# Patient Record
Sex: FEMALE | Race: WHITE | NOT HISPANIC OR LATINO | Employment: FULL TIME | URBAN - METROPOLITAN AREA
[De-identification: names, ages, dates, MRNs, and addresses within clinical notes are randomized per-mention and may not be internally consistent; named-entity substitution may affect disease eponyms.]

---

## 2021-07-30 ENCOUNTER — LAB REQUISITION (OUTPATIENT)
Dept: LAB | Facility: CLINIC | Age: 26
End: 2021-07-30

## 2021-07-30 DIAGNOSIS — Z32.01 ENCOUNTER FOR PREGNANCY TEST, RESULT POSITIVE: ICD-10-CM

## 2021-07-30 LAB
BASOPHILS # BLD AUTO: 0.1 10E3/UL (ref 0–0.2)
BASOPHILS NFR BLD AUTO: 1 %
EOSINOPHIL # BLD AUTO: 0.3 10E3/UL (ref 0–0.7)
EOSINOPHIL NFR BLD AUTO: 3 %
ERYTHROCYTE [DISTWIDTH] IN BLOOD BY AUTOMATED COUNT: 11.8 % (ref 10–15)
HCT VFR BLD AUTO: 35.5 % (ref 35–47)
HGB BLD-MCNC: 12.3 G/DL (ref 11.7–15.7)
IMM GRANULOCYTES # BLD: 0 10E3/UL
IMM GRANULOCYTES NFR BLD: 0 %
LYMPHOCYTES # BLD AUTO: 2 10E3/UL (ref 0.8–5.3)
LYMPHOCYTES NFR BLD AUTO: 21 %
MCH RBC QN AUTO: 28.9 PG (ref 26.5–33)
MCHC RBC AUTO-ENTMCNC: 34.6 G/DL (ref 31.5–36.5)
MCV RBC AUTO: 83 FL (ref 78–100)
MONOCYTES # BLD AUTO: 0.5 10E3/UL (ref 0–1.3)
MONOCYTES NFR BLD AUTO: 5 %
NEUTROPHILS # BLD AUTO: 6.7 10E3/UL (ref 1.6–8.3)
NEUTROPHILS NFR BLD AUTO: 70 %
NRBC # BLD AUTO: 0 10E3/UL
NRBC BLD AUTO-RTO: 0 /100
PLATELET # BLD AUTO: 189 10E3/UL (ref 150–450)
RBC # BLD AUTO: 4.26 10E6/UL (ref 3.8–5.2)
WBC # BLD AUTO: 9.5 10E3/UL (ref 4–11)

## 2021-07-30 PROCEDURE — 86850 RBC ANTIBODY SCREEN: CPT | Performed by: FAMILY MEDICINE

## 2021-07-30 PROCEDURE — 86780 TREPONEMA PALLIDUM: CPT | Performed by: FAMILY MEDICINE

## 2021-07-30 PROCEDURE — 85025 COMPLETE CBC W/AUTO DIFF WBC: CPT | Performed by: FAMILY MEDICINE

## 2021-07-30 PROCEDURE — 86762 RUBELLA ANTIBODY: CPT | Performed by: FAMILY MEDICINE

## 2021-07-30 PROCEDURE — 87340 HEPATITIS B SURFACE AG IA: CPT | Performed by: FAMILY MEDICINE

## 2021-07-30 PROCEDURE — 87491 CHLMYD TRACH DNA AMP PROBE: CPT | Performed by: FAMILY MEDICINE

## 2021-07-30 PROCEDURE — 86900 BLOOD TYPING SEROLOGIC ABO: CPT | Performed by: FAMILY MEDICINE

## 2021-07-31 LAB
ABO/RH(D): NORMAL
ANTIBODY SCREEN: NEGATIVE
HBV SURFACE AG SERPL QL IA: NONREACTIVE
SPECIMEN EXPIRATION DATE: NORMAL
SPECIMEN EXPIRATION DATE: NORMAL
T PALLIDUM AB SER QL: NEGATIVE

## 2021-08-01 LAB — C TRACH DNA SPEC QL NAA+PROBE: NEGATIVE

## 2021-08-02 LAB — RUBV IGG SERPL QL IA: POSITIVE

## 2021-08-31 ENCOUNTER — LAB REQUISITION (OUTPATIENT)
Dept: LAB | Facility: CLINIC | Age: 26
End: 2021-08-31

## 2021-08-31 DIAGNOSIS — Z01.419 ENCOUNTER FOR GYNECOLOGICAL EXAMINATION (GENERAL) (ROUTINE) WITHOUT ABNORMAL FINDINGS: ICD-10-CM

## 2021-08-31 PROCEDURE — 87624 HPV HI-RISK TYP POOLED RSLT: CPT | Performed by: FAMILY MEDICINE

## 2021-08-31 PROCEDURE — G0123 SCREEN CERV/VAG THIN LAYER: HCPCS | Performed by: FAMILY MEDICINE

## 2021-09-03 LAB
HUMAN PAPILLOMA VIRUS 16 DNA: NEGATIVE
HUMAN PAPILLOMA VIRUS 18 DNA: NEGATIVE
HUMAN PAPILLOMA VIRUS FINAL DIAGNOSIS: NORMAL
HUMAN PAPILLOMA VIRUS OTHER HR: NEGATIVE

## 2021-09-10 LAB
BKR LAB AP GYN ADEQUACY: NORMAL
BKR LAB AP GYN INTERPRETATION: NORMAL
BKR LAB AP HPV REFLEX: NORMAL
BKR LAB AP LMP: NORMAL
BKR LAB AP PREVIOUS ABNORMAL: NORMAL
PATH REPORT.COMMENTS IMP SPEC: NORMAL
PATH REPORT.RELEVANT HX SPEC: NORMAL

## 2024-01-06 LAB
ABO (EXTERNAL): NORMAL
HEPATITIS B SURFACE ANTIGEN (EXTERNAL): NONREACTIVE
HIV1+2 AB SERPL QL IA: NONREACTIVE
PLATELET COUNT (EXTERNAL): 186 10E3/UL (ref 140–400)
RH (EXTERNAL): POSITIVE
RUBELLA ANTIBODY IGG (EXTERNAL): NONREACTIVE
TREPONEMA PALLIDUM ANTIBODY (EXTERNAL): NONREACTIVE

## 2024-01-17 ENCOUNTER — TRANSCRIBE ORDERS (OUTPATIENT)
Dept: OTHER | Age: 29
End: 2024-01-17

## 2024-01-17 DIAGNOSIS — R51.9 HEADACHE: Primary | ICD-10-CM

## 2024-02-12 ENCOUNTER — HOSPITAL ENCOUNTER (EMERGENCY)
Facility: HOSPITAL | Age: 29
Discharge: HOME | End: 2024-02-12
Attending: EMERGENCY MEDICINE | Admitting: EMERGENCY MEDICINE

## 2024-02-12 VITALS
RESPIRATION RATE: 16 BRPM | HEART RATE: 92 BPM | OXYGEN SATURATION: 99 % | DIASTOLIC BLOOD PRESSURE: 56 MMHG | BODY MASS INDEX: 21.26 KG/M2 | TEMPERATURE: 98.6 F | SYSTOLIC BLOOD PRESSURE: 103 MMHG | WEIGHT: 120 LBS | HEIGHT: 63 IN

## 2024-02-12 DIAGNOSIS — R11.2 NAUSEA AND VOMITING, UNSPECIFIED VOMITING TYPE: ICD-10-CM

## 2024-02-12 LAB
ALBUMIN SERPL BCG-MCNC: 4.1 G/DL (ref 3.5–5.2)
ALP SERPL-CCNC: 40 U/L (ref 40–150)
ALT SERPL W P-5'-P-CCNC: 17 U/L (ref 0–50)
ANION GAP SERPL CALCULATED.3IONS-SCNC: 12 MMOL/L (ref 7–15)
AST SERPL W P-5'-P-CCNC: 20 U/L (ref 0–45)
BILIRUB DIRECT SERPL-MCNC: <0.2 MG/DL (ref 0–0.3)
BILIRUB SERPL-MCNC: 0.7 MG/DL
BUN SERPL-MCNC: 8.4 MG/DL (ref 6–20)
CALCIUM SERPL-MCNC: 8.1 MG/DL (ref 8.6–10)
CHLORIDE SERPL-SCNC: 104 MMOL/L (ref 98–107)
CREAT SERPL-MCNC: 0.49 MG/DL (ref 0.51–0.95)
DEPRECATED HCO3 PLAS-SCNC: 21 MMOL/L (ref 22–29)
EGFRCR SERPLBLD CKD-EPI 2021: >90 ML/MIN/1.73M2
ERYTHROCYTE [DISTWIDTH] IN BLOOD BY AUTOMATED COUNT: 12.9 % (ref 10–15)
GLUCOSE SERPL-MCNC: 96 MG/DL (ref 70–99)
HCT VFR BLD AUTO: 39.1 % (ref 35–47)
HGB BLD-MCNC: 13.7 G/DL (ref 11.7–15.7)
LIPASE SERPL-CCNC: 69 U/L (ref 13–60)
MCH RBC QN AUTO: 29.3 PG (ref 26.5–33)
MCHC RBC AUTO-ENTMCNC: 35 G/DL (ref 31.5–36.5)
MCV RBC AUTO: 84 FL (ref 78–100)
PLATELET # BLD AUTO: 250 10E3/UL (ref 150–450)
POTASSIUM SERPL-SCNC: 3.8 MMOL/L (ref 3.4–5.3)
PROT SERPL-MCNC: 7 G/DL (ref 6.4–8.3)
RBC # BLD AUTO: 4.67 10E6/UL (ref 3.8–5.2)
SODIUM SERPL-SCNC: 137 MMOL/L (ref 135–145)
WBC # BLD AUTO: 11.9 10E3/UL (ref 4–11)

## 2024-02-12 PROCEDURE — 85027 COMPLETE CBC AUTOMATED: CPT | Performed by: EMERGENCY MEDICINE

## 2024-02-12 PROCEDURE — 96375 TX/PRO/DX INJ NEW DRUG ADDON: CPT

## 2024-02-12 PROCEDURE — 83690 ASSAY OF LIPASE: CPT | Performed by: EMERGENCY MEDICINE

## 2024-02-12 PROCEDURE — 99284 EMERGENCY DEPT VISIT MOD MDM: CPT | Mod: 25

## 2024-02-12 PROCEDURE — 258N000003 HC RX IP 258 OP 636: Performed by: EMERGENCY MEDICINE

## 2024-02-12 PROCEDURE — 36415 COLL VENOUS BLD VENIPUNCTURE: CPT | Performed by: EMERGENCY MEDICINE

## 2024-02-12 PROCEDURE — 96361 HYDRATE IV INFUSION ADD-ON: CPT

## 2024-02-12 PROCEDURE — 96374 THER/PROPH/DIAG INJ IV PUSH: CPT

## 2024-02-12 PROCEDURE — 80053 COMPREHEN METABOLIC PANEL: CPT | Performed by: EMERGENCY MEDICINE

## 2024-02-12 PROCEDURE — 250N000011 HC RX IP 250 OP 636: Mod: JZ | Performed by: EMERGENCY MEDICINE

## 2024-02-12 RX ORDER — DIPHENHYDRAMINE HYDROCHLORIDE 50 MG/ML
25 INJECTION INTRAMUSCULAR; INTRAVENOUS ONCE
Status: COMPLETED | OUTPATIENT
Start: 2024-02-12 | End: 2024-02-12

## 2024-02-12 RX ORDER — METOCLOPRAMIDE HYDROCHLORIDE 5 MG/ML
10 INJECTION INTRAMUSCULAR; INTRAVENOUS ONCE
Status: COMPLETED | OUTPATIENT
Start: 2024-02-12 | End: 2024-02-12

## 2024-02-12 RX ORDER — PROMETHAZINE HYDROCHLORIDE 25 MG/1
25 SUPPOSITORY RECTAL EVERY 6 HOURS PRN
Qty: 12 SUPPOSITORY | Refills: 0 | Status: ON HOLD | OUTPATIENT
Start: 2024-02-12 | End: 2024-07-30

## 2024-02-12 RX ORDER — METOCLOPRAMIDE 10 MG/1
10 TABLET ORAL
Qty: 12 TABLET | Refills: 0 | Status: ON HOLD | OUTPATIENT
Start: 2024-02-12 | End: 2024-07-30

## 2024-02-12 RX ADMIN — SODIUM CHLORIDE, POTASSIUM CHLORIDE, SODIUM LACTATE AND CALCIUM CHLORIDE 1000 ML: 600; 310; 30; 20 INJECTION, SOLUTION INTRAVENOUS at 17:00

## 2024-02-12 RX ADMIN — DIPHENHYDRAMINE HYDROCHLORIDE 25 MG: 50 INJECTION, SOLUTION INTRAMUSCULAR; INTRAVENOUS at 14:14

## 2024-02-12 RX ADMIN — SODIUM CHLORIDE, POTASSIUM CHLORIDE, SODIUM LACTATE AND CALCIUM CHLORIDE 1000 ML: 600; 310; 30; 20 INJECTION, SOLUTION INTRAVENOUS at 14:12

## 2024-02-12 RX ADMIN — METOCLOPRAMIDE 10 MG: 5 INJECTION, SOLUTION INTRAMUSCULAR; INTRAVENOUS at 16:59

## 2024-02-12 RX ADMIN — PROMETHAZINE HYDROCHLORIDE 25 MG: 25 INJECTION INTRAMUSCULAR; INTRAVENOUS at 14:22

## 2024-02-12 ASSESSMENT — ACTIVITIES OF DAILY LIVING (ADL)
ADLS_ACUITY_SCORE: 35
ADLS_ACUITY_SCORE: 35

## 2024-02-12 NOTE — ED TRIAGE NOTES
Pt referred to ER for continuous nausea and vomiting. Light sensitivity, chills, body aches. Pt was tested at clinic for influenza and Covid, both were negative. Abdominal pain.     Pt is 17 weeks pregnant.

## 2024-02-12 NOTE — ED NOTES
Expected Patient Referral to ED  1:24 PM    Referring Clinic/Provider:    [facility]      Reason for referral/Clinical facts:  Intractable nausea and vomiting.  Baby ultrasound okay, needs IV fluids, could not keep down Zofran.  Coming by private car,  driving.        Recommendations provided:  Send to ED for further evaluation    Caller was informed that this institution does possess the capabilities and/or resources to provide for patient and should be transferred to our facility.    Discussed that if direct admit is sought and any hurdles are encountered, this ED would be happy to see the patient and evaluate.    Informed caller that recommendations provided are recommendations based only on the facts provided and that they responsible to accept or reject the advice, or to seek a formal in person consultation as needed and that this ED will see/treat patient should they arrive.      Nura Keating MD  [facility] EMERGENCY DEPARTMENT  [address] [place] [id number]  [phone]       Nura Keating MD  02/12/24 5618

## 2024-02-12 NOTE — ED PROVIDER NOTES
EMERGENCY DEPARTMENT ENCOUNTER      NAME: Jessica Jose  AGE: 28 year old female  YOB: 1995  MRN: [id number]  EVALUATION DATE & TIME: 2/12/2024  1:54 PM    PCP: No primary care provider on file.    ED PROVIDER: Susan Brand MD    Chief Complaint   Patient presents with    Nausea & Vomiting    Abdominal Pain         FINAL IMPRESSION:  1. Nausea and vomiting, unspecified vomiting type          ED COURSE & MEDICAL DECISION MAKING:    Pertinent Labs & Imaging studies reviewed. (See chart for details)  28 year old female with history of G2, P1 at approximately 17 weeks gestation who presents to the Emergency Department for evaluation of nausea and vomiting in association with some generalized weakness, subjective chills, photosensitivity and intermittent episodes of left-sided crampy abdominal pain since last night.  COVID and influenza negative at clinic prior to arrival.  Concern for hyperemesis, dehydration, electrolyte abnormality, GERD, pancreatitis.  Hepatobiliary pathology, appendicitis, bowel obstruction less likely given left-sided abdominal pain and normal bowel movements.  With her subjective body symptoms concern for potential food poisoning.  Her and  did eat the same meal last night however and he is not having any symptomatology.  Overall her abdomen is benign and my concern for peritoneal etiology is low.    Patient placed on monitor, IV established and blood obtained.  Initially tachycardic, normalized on recheck.  Given 1 L LR bolus, Phenergan, Benadryl.  CBC, BMP, LFTs, lipase really unremarkable.  Patient initially felt improved after the above, was able to tolerate liquids, but when she tried crackers vomited again.  Given second liter LR bolus along with Reglan and was able to tolerate food and fluids.  Pain has resolved, and she is not having any abdominal pain.  Abdomen remains benign.  Safe for discharge home with reassuring workup above, encouraged close follow-up  with her OB/GYN.  Warning signs return to ED discussed.          ED Course as of 02/12/24 1958 Mon Feb 12, 2024 1654 Patient states pain is resolved.  Felt improved after the Phenergan but tried oral challenge.  Able to keep down liquids, but vomited cracker.   1801 Pt now tolerating food/fluids       Medical Decision Making    History:  Supplemental history from: Family Member/Significant Other  External Record(s) reviewed: Documented in chart    Work Up:  Chart documentation includes differential considered and any EKGs or imaging independently interpreted by provider, see MDM  In additional to work up documented, I considered the following work up: see MDM    External consultation:  Discussion of management with another provider: N/A    Complicating factors:  Care impacted by chronic illness: N/A  Care affected by social determinants of health: Access to Medical Care referred to ED    Disposition considerations: Discharge. I prescribed additional prescription strength medication(s) as charted. See documentation for any additional details.        At the conclusion of the encounter I discussed the results of all of the tests and the disposition. The questions were answered. The patient or family acknowledged understanding and was agreeable with the care plan.    MEDICATIONS GIVEN IN THE EMERGENCY:  Medications   lactated ringers BOLUS 1,000 mL (0 mLs Intravenous Stopped 2/12/24 1655)   promethazine (PHENERGAN) 25 mg in sodium chloride 0.9 % 55 mL intermittent infusion (25 mg Intravenous $Given 2/12/24 1422)   diphenhydrAMINE (BENADRYL) injection 25 mg (25 mg Intravenous $Given 2/12/24 1414)   lactated ringers BOLUS 1,000 mL (0 mLs Intravenous Stopped 2/12/24 1833)   metoclopramide (REGLAN) injection 10 mg (10 mg Intravenous $Given 2/12/24 1659)       NEW PRESCRIPTIONS STARTED AT TODAY'S ER VISIT  Discharge Medication List as of 2/12/2024  6:34 PM        START taking these medications    Details   metoclopramide  (REGLAN) 10 MG tablet Take 1 tablet (10 mg) by mouth 4 times daily (before meals and nightly), Disp-12 tablet, R-0, Local Print      promethazine (PHENERGAN) 25 MG suppository Place 1 suppository (25 mg) rectally every 6 hours as needed for nausea, Disp-12 suppository, R-0, Local Print                =================================================================    HPI    Patient information was obtained from: Patient,     Use of Intrepreter: N/A      Jessica Jose is a 28 year old female with pertinent medical history of G2, P1 at approximately 17 weeks gestation who presents nausea and vomiting.  Patient had a lot of nausea and vomiting associated with her first trimester but this trended better.  Last week she had several days of nausea and vomiting but the seem to be associated with constipation and resolved with MiraLAX and prune juice and she has now been back to having normal bowel movements.  Again starting last night, developed nausea but this worsened today with generalized body aches, chills, photophobia, intractable nausea and vomiting.  Patient was seen at her [facility] where she follows with for OB/GYN.  They did a COVID and flu test and both of these were negative.  Nausea did not resolve after Zofran.  Patient also notes some intermittent sharp stabbing episodes of left sided abdominal pain that comes and goes and then will linger.  This seems to be in association with the nausea.  They did obtain fetal heart rates in clinic that were unremarkable reportedly with normal ultrasound.  Sent here for further evaluation.  No recent travel, sick contacts, raw undercooked meat, seafood, sushi.      PAST MEDICAL HISTORY:  No past medical history on file.    PAST SURGICAL HISTORY:  No past surgical history on file.    CURRENT MEDICATIONS:    None       ALLERGIES:  No Known Allergies    FAMILY HISTORY:  No family history on file.    SOCIAL HISTORY:        VITALS:  Patient Vitals for  "the past 24 hrs:   BP Temp Pulse Resp SpO2 Height Weight   02/12/24 1830 103/56 -- 92 16 99 % -- --   02/12/24 1800 109/65 -- 96 -- 100 % -- --   02/12/24 1730 108/63 -- 105 -- 98 % -- --   02/12/24 1715 -- -- 113 -- 98 % -- --   02/12/24 1700 103/58 -- -- -- -- -- --   02/12/24 1500 116/73 -- 99 -- 100 % -- --   02/12/24 1445 108/67 -- 88 -- 100 % -- --   02/12/24 1430 108/69 -- 86 -- 100 % -- --   02/12/24 1415 110/74 -- 100 16 100 % -- --   02/12/24 1400 107/71 -- -- -- -- -- --   02/12/24 1352 109/66 98.6  F (37  C) 110 16 100 % 1.6 m (5' 3\") 54.4 kg (120 lb)       PHYSICAL EXAM    General Appearance: Well-appearing, well-nourished, no acute distress   Head:  Normocephalic  Chest:  No tenderness or deformity, no crepitus  Cardio: Initially tachycardic normalized on recheck without intervention.  Pulm:  No respiratory distress, clear to auscultation bilaterally  Back:  No CVA tenderness, normal ROM  Abdomen:  Soft, non-tender, no rebound or guarding.  Uterus is gravid below the umbilicus  Extremities: Normal gait  Neuro:  Alert and oriented ×3     RADIOLOGY/LABS:  Reviewed all pertinent imaging. Please see official radiology report. All pertinent labs reviewed and interpreted.    Results for orders placed or performed during the hospital encounter of 02/12/24   CBC (+ platelets, no diff)   Result Value Ref Range    WBC Count 11.9 (H) 4.0 - 11.0 10e3/uL    RBC Count 4.67 3.80 - 5.20 10e6/uL    Hemoglobin 13.7 11.7 - 15.7 g/dL    Hematocrit 39.1 35.0 - 47.0 %    MCV 84 78 - 100 fL    MCH 29.3 26.5 - 33.0 pg    MCHC 35.0 31.5 - 36.5 g/dL    RDW 12.9 10.0 - 15.0 %    Platelet Count 250 150 - 450 10e3/uL   Basic metabolic panel   Result Value Ref Range    Sodium 137 135 - 145 mmol/L    Potassium 3.8 3.4 - 5.3 mmol/L    Chloride 104 98 - 107 mmol/L    Carbon Dioxide (CO2) 21 (L) 22 - 29 mmol/L    Anion Gap 12 7 - 15 mmol/L    Urea Nitrogen 8.4 6.0 - 20.0 mg/dL    Creatinine 0.49 (L) 0.51 - 0.95 mg/dL    GFR Estimate " >90 >60 mL/min/1.73m2    Calcium 8.1 (L) 8.6 - 10.0 mg/dL    Glucose 96 70 - 99 mg/dL   Hepatic function panel   Result Value Ref Range    Protein Total 7.0 6.4 - 8.3 g/dL    Albumin 4.1 3.5 - 5.2 g/dL    Bilirubin Total 0.7 <=1.2 mg/dL    Alkaline Phosphatase 40 40 - 150 U/L    AST 20 0 - 45 U/L    ALT 17 0 - 50 U/L    Bilirubin Direct <0.20 0.00 - 0.30 mg/dL   Result Value Ref Range    Lipase 69 (H) 13 - 60 U/L         Susan Brand MD  Emergency Medicine  [facility] EMERGENCY DEPARTMENT  [address] [facility] [id number] [phone]  Dept: [phone]     Susan Brand MD  02/12/24 1959

## 2024-02-13 NOTE — DISCHARGE INSTRUCTIONS
You may use your Zofran, Reglan and and/or Phenergan suppository as needed.  Small, frequent sips of electrolyte containing food/fluids.

## 2024-06-22 LAB — GROUP B STREPTOCOCCUS (EXTERNAL): NEGATIVE

## 2024-07-23 ENCOUNTER — TRANSFERRED RECORDS (OUTPATIENT)
Dept: HEALTH INFORMATION MANAGEMENT | Facility: CLINIC | Age: 29
End: 2024-07-23

## 2024-07-28 ENCOUNTER — HOSPITAL ENCOUNTER (INPATIENT)
Facility: HOSPITAL | Age: 29
LOS: 2 days | Discharge: HOME | End: 2024-07-30
Attending: OBSTETRICS & GYNECOLOGY | Admitting: OBSTETRICS & GYNECOLOGY

## 2024-07-28 ENCOUNTER — ANESTHESIA (OUTPATIENT)
Dept: OBGYN | Facility: HOSPITAL | Age: 29
End: 2024-07-28

## 2024-07-28 ENCOUNTER — ANESTHESIA EVENT (OUTPATIENT)
Dept: OBGYN | Facility: HOSPITAL | Age: 29
End: 2024-07-28

## 2024-07-28 DIAGNOSIS — O48.0 41 WEEKS GESTATION OF PREGNANCY: Primary | ICD-10-CM

## 2024-07-28 DIAGNOSIS — Z3A.41 41 WEEKS GESTATION OF PREGNANCY: Primary | ICD-10-CM

## 2024-07-28 PROBLEM — Z34.90 PREGNANCY: Status: ACTIVE | Noted: 2024-07-28

## 2024-07-28 LAB
ABO/RH(D): NORMAL
ANTIBODY SCREEN: NEGATIVE
BASOPHILS # BLD AUTO: 0 10E3/UL (ref 0–0.2)
BASOPHILS NFR BLD AUTO: 0 %
EOSINOPHIL # BLD AUTO: 0 10E3/UL (ref 0–0.7)
EOSINOPHIL NFR BLD AUTO: 0 %
ERYTHROCYTE [DISTWIDTH] IN BLOOD BY AUTOMATED COUNT: 12.8 % (ref 10–15)
HCT VFR BLD AUTO: 33.7 % (ref 35–47)
HGB BLD-MCNC: 11.4 G/DL (ref 11.7–15.7)
IMM GRANULOCYTES # BLD: 0.1 10E3/UL
IMM GRANULOCYTES NFR BLD: 1 %
LYMPHOCYTES # BLD AUTO: 1.4 10E3/UL (ref 0.8–5.3)
LYMPHOCYTES NFR BLD AUTO: 15 %
MCH RBC QN AUTO: 27.4 PG (ref 26.5–33)
MCHC RBC AUTO-ENTMCNC: 33.8 G/DL (ref 31.5–36.5)
MCV RBC AUTO: 81 FL (ref 78–100)
MONOCYTES # BLD AUTO: 0.5 10E3/UL (ref 0–1.3)
MONOCYTES NFR BLD AUTO: 5 %
NEUTROPHILS # BLD AUTO: 7.6 10E3/UL (ref 1.6–8.3)
NEUTROPHILS NFR BLD AUTO: 79 %
NRBC # BLD AUTO: 0 10E3/UL
NRBC BLD AUTO-RTO: 0 /100
PLATELET # BLD AUTO: 141 10E3/UL (ref 150–450)
RBC # BLD AUTO: 4.16 10E6/UL (ref 3.8–5.2)
SPECIMEN EXPIRATION DATE: NORMAL
T PALLIDUM AB SER QL: NONREACTIVE
WBC # BLD AUTO: 9.6 10E3/UL (ref 4–11)

## 2024-07-28 PROCEDURE — 36415 COLL VENOUS BLD VENIPUNCTURE: CPT | Performed by: OBSTETRICS & GYNECOLOGY

## 2024-07-28 PROCEDURE — 370N000003 HC ANESTHESIA WARD SERVICE: Performed by: ANESTHESIOLOGY

## 2024-07-28 PROCEDURE — 59400 OBSTETRICAL CARE: CPT | Performed by: ANESTHESIOLOGY

## 2024-07-28 PROCEDURE — 86780 TREPONEMA PALLIDUM: CPT | Performed by: OBSTETRICS & GYNECOLOGY

## 2024-07-28 PROCEDURE — 10907ZC DRAINAGE OF AMNIOTIC FLUID, THERAPEUTIC FROM PRODUCTS OF CONCEPTION, VIA NATURAL OR ARTIFICIAL OPENING: ICD-10-PCS | Performed by: OBSTETRICS & GYNECOLOGY

## 2024-07-28 PROCEDURE — 258N000003 HC RX IP 258 OP 636: Performed by: OBSTETRICS & GYNECOLOGY

## 2024-07-28 PROCEDURE — 3E0R3BZ INTRODUCTION OF ANESTHETIC AGENT INTO SPINAL CANAL, PERCUTANEOUS APPROACH: ICD-10-PCS | Performed by: ANESTHESIOLOGY

## 2024-07-28 PROCEDURE — 00HU33Z INSERTION OF INFUSION DEVICE INTO SPINAL CANAL, PERCUTANEOUS APPROACH: ICD-10-PCS | Performed by: ANESTHESIOLOGY

## 2024-07-28 PROCEDURE — 250N000011 HC RX IP 250 OP 636: Performed by: ANESTHESIOLOGY

## 2024-07-28 PROCEDURE — 250N000011 HC RX IP 250 OP 636: Performed by: OBSTETRICS & GYNECOLOGY

## 2024-07-28 PROCEDURE — 250N000009 HC RX 250: Performed by: FAMILY MEDICINE

## 2024-07-28 PROCEDURE — 120N000001 HC R&B MED SURG/OB

## 2024-07-28 PROCEDURE — 86900 BLOOD TYPING SEROLOGIC ABO: CPT | Performed by: OBSTETRICS & GYNECOLOGY

## 2024-07-28 PROCEDURE — 85025 COMPLETE CBC W/AUTO DIFF WBC: CPT | Performed by: OBSTETRICS & GYNECOLOGY

## 2024-07-28 RX ORDER — ONDANSETRON 4 MG/1
4 TABLET, ORALLY DISINTEGRATING ORAL EVERY 6 HOURS PRN
Status: DISCONTINUED | OUTPATIENT
Start: 2024-07-28 | End: 2024-07-29 | Stop reason: HOSPADM

## 2024-07-28 RX ORDER — NALOXONE HYDROCHLORIDE 0.4 MG/ML
0.4 INJECTION, SOLUTION INTRAMUSCULAR; INTRAVENOUS; SUBCUTANEOUS
Status: DISCONTINUED | OUTPATIENT
Start: 2024-07-28 | End: 2024-07-29 | Stop reason: HOSPADM

## 2024-07-28 RX ORDER — SODIUM CHLORIDE, SODIUM LACTATE, POTASSIUM CHLORIDE, CALCIUM CHLORIDE 600; 310; 30; 20 MG/100ML; MG/100ML; MG/100ML; MG/100ML
INJECTION, SOLUTION INTRAVENOUS CONTINUOUS
Status: DISCONTINUED | OUTPATIENT
Start: 2024-07-28 | End: 2024-07-28

## 2024-07-28 RX ORDER — FENTANYL/ROPIVACAINE/NS/PF 2MCG/ML-.1
PLASTIC BAG, INJECTION (ML) EPIDURAL
Status: DISCONTINUED | OUTPATIENT
Start: 2024-07-28 | End: 2024-07-29 | Stop reason: HOSPADM

## 2024-07-28 RX ORDER — IBUPROFEN 800 MG/1
800 TABLET, FILM COATED ORAL
Status: COMPLETED | OUTPATIENT
Start: 2024-07-28 | End: 2024-07-29

## 2024-07-28 RX ORDER — NALOXONE HYDROCHLORIDE 0.4 MG/ML
0.2 INJECTION, SOLUTION INTRAMUSCULAR; INTRAVENOUS; SUBCUTANEOUS
Status: DISCONTINUED | OUTPATIENT
Start: 2024-07-28 | End: 2024-07-29 | Stop reason: HOSPADM

## 2024-07-28 RX ORDER — METOCLOPRAMIDE HYDROCHLORIDE 5 MG/ML
10 INJECTION INTRAMUSCULAR; INTRAVENOUS EVERY 6 HOURS PRN
Status: DISCONTINUED | OUTPATIENT
Start: 2024-07-28 | End: 2024-07-29 | Stop reason: HOSPADM

## 2024-07-28 RX ORDER — OXYTOCIN/0.9 % SODIUM CHLORIDE 30/500 ML
100-340 PLASTIC BAG, INJECTION (ML) INTRAVENOUS CONTINUOUS PRN
Status: DISCONTINUED | OUTPATIENT
Start: 2024-07-28 | End: 2024-07-30 | Stop reason: HOSPADM

## 2024-07-28 RX ORDER — PROCHLORPERAZINE 25 MG
25 SUPPOSITORY, RECTAL RECTAL EVERY 12 HOURS PRN
Status: DISCONTINUED | OUTPATIENT
Start: 2024-07-28 | End: 2024-07-29 | Stop reason: HOSPADM

## 2024-07-28 RX ORDER — PROCHLORPERAZINE MALEATE 10 MG
10 TABLET ORAL EVERY 6 HOURS PRN
Status: DISCONTINUED | OUTPATIENT
Start: 2024-07-28 | End: 2024-07-29 | Stop reason: HOSPADM

## 2024-07-28 RX ORDER — OXYTOCIN/0.9 % SODIUM CHLORIDE 30/500 ML
1-8 PLASTIC BAG, INJECTION (ML) INTRAVENOUS CONTINUOUS
Status: DISCONTINUED | OUTPATIENT
Start: 2024-07-28 | End: 2024-07-29 | Stop reason: HOSPADM

## 2024-07-28 RX ORDER — ONDANSETRON 2 MG/ML
4 INJECTION INTRAMUSCULAR; INTRAVENOUS EVERY 6 HOURS PRN
Status: DISCONTINUED | OUTPATIENT
Start: 2024-07-28 | End: 2024-07-29 | Stop reason: HOSPADM

## 2024-07-28 RX ORDER — METHYLERGONOVINE MALEATE 0.2 MG/ML
200 INJECTION INTRAVENOUS
Status: DISCONTINUED | OUTPATIENT
Start: 2024-07-28 | End: 2024-07-29 | Stop reason: HOSPADM

## 2024-07-28 RX ORDER — KETOROLAC TROMETHAMINE 30 MG/ML
30 INJECTION, SOLUTION INTRAMUSCULAR; INTRAVENOUS
Status: COMPLETED | OUTPATIENT
Start: 2024-07-28 | End: 2024-07-29

## 2024-07-28 RX ORDER — LOPERAMIDE HCL 2 MG
4 CAPSULE ORAL
Status: DISCONTINUED | OUTPATIENT
Start: 2024-07-28 | End: 2024-07-29 | Stop reason: HOSPADM

## 2024-07-28 RX ORDER — ALBUTEROL SULFATE 90 UG/1
2 AEROSOL, METERED RESPIRATORY (INHALATION) DAILY PRN
Status: DISCONTINUED | OUTPATIENT
Start: 2024-07-28 | End: 2024-07-30 | Stop reason: HOSPADM

## 2024-07-28 RX ORDER — MISOPROSTOL 200 UG/1
400 TABLET ORAL
Status: DISCONTINUED | OUTPATIENT
Start: 2024-07-28 | End: 2024-07-29 | Stop reason: HOSPADM

## 2024-07-28 RX ORDER — OXYTOCIN 10 [USP'U]/ML
10 INJECTION, SOLUTION INTRAMUSCULAR; INTRAVENOUS
Status: DISCONTINUED | OUTPATIENT
Start: 2024-07-28 | End: 2024-07-29 | Stop reason: HOSPADM

## 2024-07-28 RX ORDER — SODIUM CHLORIDE, SODIUM LACTATE, POTASSIUM CHLORIDE, CALCIUM CHLORIDE 600; 310; 30; 20 MG/100ML; MG/100ML; MG/100ML; MG/100ML
INJECTION, SOLUTION INTRAVENOUS CONTINUOUS PRN
Status: DISCONTINUED | OUTPATIENT
Start: 2024-07-28 | End: 2024-07-29 | Stop reason: HOSPADM

## 2024-07-28 RX ORDER — ALBUTEROL SULFATE 90 UG/1
2 AEROSOL, METERED RESPIRATORY (INHALATION) EVERY 6 HOURS PRN
Status: DISCONTINUED | OUTPATIENT
Start: 2024-07-28 | End: 2024-07-28

## 2024-07-28 RX ORDER — CARBOPROST TROMETHAMINE 250 UG/ML
250 INJECTION, SOLUTION INTRAMUSCULAR
Status: DISCONTINUED | OUTPATIENT
Start: 2024-07-28 | End: 2024-07-29 | Stop reason: HOSPADM

## 2024-07-28 RX ORDER — CITRIC ACID/SODIUM CITRATE 334-500MG
30 SOLUTION, ORAL ORAL
Status: DISCONTINUED | OUTPATIENT
Start: 2024-07-28 | End: 2024-07-29 | Stop reason: HOSPADM

## 2024-07-28 RX ORDER — NALBUPHINE HYDROCHLORIDE 20 MG/ML
2.5-5 INJECTION, SOLUTION INTRAMUSCULAR; INTRAVENOUS; SUBCUTANEOUS EVERY 6 HOURS PRN
Status: DISCONTINUED | OUTPATIENT
Start: 2024-07-28 | End: 2024-07-30 | Stop reason: HOSPADM

## 2024-07-28 RX ORDER — FENTANYL CITRATE 50 UG/ML
100 INJECTION, SOLUTION INTRAMUSCULAR; INTRAVENOUS
Status: DISCONTINUED | OUTPATIENT
Start: 2024-07-28 | End: 2024-07-29 | Stop reason: HOSPADM

## 2024-07-28 RX ORDER — METOCLOPRAMIDE 10 MG/1
10 TABLET ORAL EVERY 6 HOURS PRN
Status: DISCONTINUED | OUTPATIENT
Start: 2024-07-28 | End: 2024-07-29 | Stop reason: HOSPADM

## 2024-07-28 RX ORDER — MISOPROSTOL 200 UG/1
800 TABLET ORAL
Status: DISCONTINUED | OUTPATIENT
Start: 2024-07-28 | End: 2024-07-29 | Stop reason: HOSPADM

## 2024-07-28 RX ORDER — LOPERAMIDE HCL 2 MG
2 CAPSULE ORAL
Status: DISCONTINUED | OUTPATIENT
Start: 2024-07-28 | End: 2024-07-29 | Stop reason: HOSPADM

## 2024-07-28 RX ORDER — TRANEXAMIC ACID 10 MG/ML
1 INJECTION, SOLUTION INTRAVENOUS EVERY 30 MIN PRN
Status: DISCONTINUED | OUTPATIENT
Start: 2024-07-28 | End: 2024-07-29 | Stop reason: HOSPADM

## 2024-07-28 RX ORDER — OXYTOCIN 10 [USP'U]/ML
10 INJECTION, SOLUTION INTRAMUSCULAR; INTRAVENOUS
Status: DISCONTINUED | OUTPATIENT
Start: 2024-07-28 | End: 2024-07-30 | Stop reason: HOSPADM

## 2024-07-28 RX ORDER — LIDOCAINE 40 MG/G
CREAM TOPICAL
Status: DISCONTINUED | OUTPATIENT
Start: 2024-07-28 | End: 2024-07-29 | Stop reason: HOSPADM

## 2024-07-28 RX ORDER — ALBUTEROL SULFATE 90 UG/1
2 AEROSOL, METERED RESPIRATORY (INHALATION) PRN
COMMUNITY
Start: 2024-04-26

## 2024-07-28 RX ORDER — OXYTOCIN/0.9 % SODIUM CHLORIDE 30/500 ML
340 PLASTIC BAG, INJECTION (ML) INTRAVENOUS CONTINUOUS PRN
Status: DISCONTINUED | OUTPATIENT
Start: 2024-07-28 | End: 2024-07-29 | Stop reason: HOSPADM

## 2024-07-28 RX ORDER — FENTANYL CITRATE-0.9 % NACL/PF 10 MCG/ML
100 PLASTIC BAG, INJECTION (ML) INTRAVENOUS EVERY 5 MIN PRN
Status: DISCONTINUED | OUTPATIENT
Start: 2024-07-28 | End: 2024-07-29 | Stop reason: HOSPADM

## 2024-07-28 RX ADMIN — Medication: at 14:58

## 2024-07-28 RX ADMIN — Medication 1 MILLI-UNITS/MIN: at 18:43

## 2024-07-28 RX ADMIN — ONDANSETRON 4 MG: 2 INJECTION INTRAMUSCULAR; INTRAVENOUS at 21:13

## 2024-07-28 RX ADMIN — SODIUM CHLORIDE, POTASSIUM CHLORIDE, SODIUM LACTATE AND CALCIUM CHLORIDE 500 ML: 600; 310; 30; 20 INJECTION, SOLUTION INTRAVENOUS at 14:39

## 2024-07-28 ASSESSMENT — ACTIVITIES OF DAILY LIVING (ADL)
ADLS_ACUITY_SCORE: 18
ADLS_ACUITY_SCORE: 35
ADLS_ACUITY_SCORE: 18

## 2024-07-28 NOTE — PROGRESS NOTES
Dr. Brumfield in department, updated on pt status.    Pt coping well, standing/swaying at bedside.

## 2024-07-28 NOTE — PROGRESS NOTES
Updated Dr. Brumfield on patient arrival, EFM and SVE. Patient reports not signing a consent for TOLAC, Tolac signed by Octaviano on 0605. Admission orders placed, plan to update Dr. Tipton for further updates.    Dr. Tipton updated at 0610, plan to reevaluate SVE in an hour. Ok per provider to place albuterol order. Patient verbalized agreement to plan

## 2024-07-28 NOTE — ANESTHESIA PROCEDURE NOTES
"Epidural catheter Procedure Note    Pre-Procedure   Staff -        Anesthesiologist:  Raudel Paredes MD       Performed By: anesthesiologist       Location: OB       Procedure Start/Stop Times: 7/28/2024 2:58 PM and 7/28/2024 3:15 PM       Pre-Anesthestic Checklist: patient identified, IV checked, risks and benefits discussed, informed consent, monitors and equipment checked, pre-op evaluation and at physician/surgeon's request  Timeout:       Correct Patient: Yes        Correct Procedure: Yes        Correct Site: Yes        Correct Position: Yes   Procedure Documentation  Procedure: epidural catheter       Patient Position: sitting       Patient Prep/Sterile Barriers: sterile gloves, mask, patient draped       Skin prep: Chloraprep       Local skin infiltrated with 3 mL of 1% lidocaine.        Insertion Site: L1-2. (midline approach).       Technique: LORT air        Needle Type: Zealify       Needle Gauge: 18.        Needle Length (Inches): 3.5        Catheter: 20 G.          Catheter threaded easily.         7 cm epidural space.         Threaded 12 cm at skin.         # of attempts: 1 and  # of redirects:  1    Assessment/Narrative         Paresthesias: No.       Test dose of 3 mL lidocaine 1.5% w/ 1:200,000 epinephrine at 15:13 CDT.         Test dose negative, 3 minutes after injection, for signs of intravascular, subdural, or intrathecal injection.       Insertion/Infusion Method: LORT air       Aspiration negative for Heme or CSF via Epidural Catheter.    Medication(s) Administered   Medication Administration Time: 7/28/2024 2:58 PM      FOR [facility] ([facility] ONLY:   Pain Team Contact information: please page the Pain Team Via [employer] Compliance. Search \"Pain\". During daytime hours, please page the attending first. At night please page the resident first.      "

## 2024-07-28 NOTE — PROGRESS NOTES
"1200 - Assumed care of patient from Elyssa JOINER.     RN assisting patient in position changes. Patient states that she is \"much more uncomfortable\", however, states is coping with pain.     2013-7120 : Indeterminate baseline. FHR ranges from 150-175 bpm.     6778-4212 Fetal tachycardia noted with late decelerations. During contractions, patient visibly hyperventilating. Pt reports \"tingling lips\" and \"dizziness\". RN instructs patient on how to breathe deeply to avoid this feeling.     Patient requests Nitrous Oxide, FHT return to baseline 130 bpm with moderate variability and accelerations. Patient breathing well into mask.       1258 - Dr Santo called by RN to update on FHT tracing and pt status. No answer, message left.     1301 - MD called a second time by RN. No answer.   "

## 2024-07-28 NOTE — H&P
"24   Jessica REN Gjengdahl   1995    History&Physical     HPI: 29 year old  at 41w3d presents with contractions. Feeling fetal movement. Denies leaking of fluid. Denies vaginal bleeding. Pregnancy complicated by h/o CS x1 desires TOLAC and asthma.    OB History: reviewed  OB History    Para Term  AB Living   2 1 1 0 0 1   SAB IAB Ectopic Multiple Live Births   0 0 0 0 1      # Outcome Date GA Lbr Allan/2nd Weight Sex Type Anes PTL Lv   2 Current            1 Term 22 42w0d   F CS-LTranv EPI N JUANITA      Complications: Failure to Progress in First Stage     GYN History: denies history of STDs or pelvic infections; denies history of abnormal PAP smears    Medical History: Asthma with exercise    Surgical History:   ACL surgery R knee- 2017       Family History: denies history of bleeding or clotting disorders     Social History:  denies use of tobacco, drugs, or alcohol    Medications: PNV    Allergies: reviewed   No Known Allergies    Vital signs:  Temp: 98.2  F (36.8  C) Temp src: Oral BP: 114/70 Pulse: 82   Resp: 18   O2 Device: None (Room air)   Height: 157.5 cm (5' 2\") Weight: 68 kg (150 lb)  Estimated body mass index is 27.44 kg/m  as calculated from the following:    Height as of this encounter: 1.575 m (5' 2\").    Weight as of this encounter: 68 kg (150 lb).    Physical Exam:  General: uncomfortable with contractions  Abdomen: gravid, nontender  Extremities: no calf pain, no edema in legs  SVE: 3/90/-2; cephalic presentation; fetal head well applied; AROM with amnihook at 1130 with clear fluid returned    FHRT: 130bpm, + accelerations, intermittent variable decelarations, mod variability  TOCO: q1-8min    Labs: all recent labs reviewed    Assessment/Plan: 29 year old  at 41w3d  1. IUP at 41w3d with labor  - discussed option for augmentation, and patient desires AROM; see above  2. Fetal Status  - fetal status reassuring   - continuous monitoring  - SVE confirmed " cephalic presentation  - HC <10% on US 3/6/24, FL <10%ile on US 5/3/24; patient declined MFM US; normal measurements on follow up US 6/19/24  - 6/19/24 US EFW 2804g 52.4%, AC 88.5%, HC 37.9%, FL 16.9%  3. H/o CS x1  - G1, 3/18/2022, CS for failure to progress, OP position, 7lbs 15oz  - Op note reviewed by Dr. Brumfield 6/5/24  --low transverse, 2 layer closure, 0vicryl/0monocryl  - Desires TOLAC  - Dr. Brumfield reviewed risks of TOLAC with patient per hospital consent, consent signed 6/5/24  - Scar thickness US 36 weeks- scar 5mm 6/19/24  4. Asthma   - with exercise   - took albuterol this morning while laboring at home  - continue PTA albuterol prn  5. Gestational Thrombocytopenia    - Plt 141 on admission (250 on 2/12/24)   - check plt PP to assure stable prior to discharge   6. Prenatal labs   - GBS neg   - Blood Type: O+  - Antibody screen: negative   - RPR NR, HepBsAg NR, Rubella immune, HIV NR, HepCAb NR   - passed 1h GTT    Discussed plan of care with patient and , and the patient expressed understanding. Opportunity for questions given, and all questions were answered.      Dr. Tipton following patient currently.      Charlene Brumfield MD

## 2024-07-28 NOTE — PROGRESS NOTES
Data: Patient presented to [facility]: 2024  5:02 AM.  Reason for maternal/fetal assessment is uterine contractions. Patient reports contracts started at 0430. Patient denies leaking of vaginal fluid/rupture of membranes, vaginal bleeding, nausea, vomiting, headache, visual disturbances, epigastric or RUQ pain, significant edema. Patient reports fetal movement is normal. Patient is a 41w3d . Prenatal record reviewed. Pregnancy has been complicated by Hx of C/s . Support person is present. Patient desires .    Fetal HR baseline was 120, variability is moderate . Accelerations: present  . Decelerations:  absent . Cervix 3 cm dilated and 70% effaced. Fetal station -2. Fetal presentation   per cervical exam. Membranes: intact.    Vital signs wnl. Patient reports pain and is coping.     Action: Verbal consent for EFM. Triage assessment completed. Will contact provider for further updates.

## 2024-07-28 NOTE — PROGRESS NOTES
This RN performed a cervical exam at 0805, cervix was 3/70/-1, post, soft. Salguero of 9.    Updated Dr. Tipton at 0815, said to offer, discharge, AROM, low dose Pitocin or continue to monitor and re-evaluate.    This RN discussed options with the pt and she would like to continue to monitor and re-evaluate at this time. Pt says she is coping and able to rest in between ctns. Will order food.    Updated Dr. Tipton on pt's decision, will update in a few hours.

## 2024-07-28 NOTE — PROGRESS NOTES
Jessica requested SVE to know labor progress. SVE unchanged. Contractions palpate mild to moderate.   Dr Brumfield/Hnasa order for low dose Pitocin. Jessica is agreeable to plan of care.

## 2024-07-28 NOTE — PROGRESS NOTES
Checked on pt at 1015, says she is still coping but getting exhausted. Asked about possible. This RN described what that would like. Pt asked for an 1hr to decide and rest.     This RN notified Dr. Brumfield in department, she will see her at 11.

## 2024-07-28 NOTE — PROGRESS NOTES
Received report and assumed care from Rubi Basurto RN.    Pt coping well, standing, swaying, rocking and changing multiple positions at bedside.    Discussed past labor and delivery. Pt desires a different labor then she had her first time. Wants to be active maybe eventually get an epidural but wants to be active as long as possible. Encouraged pt to balance activity with rest.     Pt deferred a cervical exam until 0800. Changed monitor to tele pt ambulating in cat. Will do a full assessment and cervical exam at 0800 per pt request.

## 2024-07-28 NOTE — PLAN OF CARE
Problem: Labor  Goal: Absence of Infection Signs and Symptoms  Outcome: Progressing     Problem: Labor  Goal: Acceptable Pain Control  Outcome: Progressing   Goal Outcome Evaluation:    Jessica reports tolerating pain with contractions currently Category one tracing. VSS and call light within reach.  Significant other Nura supportive at bedside. Report given to Elyssa JOINER.

## 2024-07-28 NOTE — PROGRESS NOTES
RN at bedside for 15 minutes following Nitrous Oxide 50/50 inhalation initiation.  Patient is observed using the equipment appropriately.  Patient appears to be coping with labor.  Patient is free of side effects.

## 2024-07-28 NOTE — ANESTHESIA PREPROCEDURE EVALUATION
"Anesthesia Pre-Procedure Evaluation    Patient: Jessica REN Gjengdahl   MRN: [id number] : 1995        Procedure :           History reviewed. No pertinent past medical history.   Past Surgical History:   Procedure Laterality Date     SECTION Bilateral 2022      No Known Allergies   Social History     Tobacco Use    Smoking status: Never     Passive exposure: Never    Smokeless tobacco: Never   Substance Use Topics    Alcohol use: Never      Wt Readings from Last 1 Encounters:   24 68 kg (150 lb)        Anesthesia Evaluation   Pt has had prior anesthetic.         ROS/MED HX  ENT/Pulmonary:  - neg pulmonary ROS     Neurologic:  - neg neurologic ROS     Cardiovascular:  - neg cardiovascular ROS     METS/Exercise Tolerance:     Hematologic:  - neg hematologic  ROS     Musculoskeletal:  - neg musculoskeletal ROS     GI/Hepatic:  - neg GI/hepatic ROS     Renal/Genitourinary:  - neg Renal ROS     Endo:  - neg endo ROS     Psychiatric/Substance Use:  - neg psychiatric ROS     Infectious Disease:  - neg infectious disease ROS     Malignancy:       Other:      (+) Possibly pregnant, , ,         Physical Exam    Airway  airway exam normal           Respiratory Devices and Support         Dental       (+) Completely normal teeth      Cardiovascular   cardiovascular exam normal          Pulmonary   pulmonary exam normal                OUTSIDE LABS:  CBC:   Lab Results   Component Value Date    WBC 9.6 2024    WBC 11.9 (H) 2024    HGB 11.4 (L) 2024    HGB 13.7 2024    HCT 33.7 (L) 2024    HCT 39.1 2024     (L) 2024     2024     BMP:   Lab Results   Component Value Date     2024    POTASSIUM 3.8 2024    CHLORIDE 104 2024    CO2 21 (L) 2024    BUN 8.4 2024    CR 0.49 (L) 2024    GLC 96 2024     COAGS: No results found for: \"PTT\", \"INR\", \"FIBR\"  POC: No results found for: \"BGM\", \"HCG\", " "\"HCGS\"  HEPATIC:   Lab Results   Component Value Date    ALBUMIN 4.1 02/12/2024    PROTTOTAL 7.0 02/12/2024    ALT 17 02/12/2024    AST 20 02/12/2024    ALKPHOS 40 02/12/2024    BILITOTAL 0.7 02/12/2024     OTHER:   Lab Results   Component Value Date    ANKITA 8.1 (L) 02/12/2024    LIPASE 69 (H) 02/12/2024       Anesthesia Plan    ASA Status:  2       Anesthesia Type: Epidural.              Consents    Anesthesia Plan(s) and associated risks, benefits, and realistic alternatives discussed. Questions answered and patient/representative(s) expressed understanding.     - Discussed:     - Discussed with:  Patient, Spouse      - Extended Intubation/Ventilatory Support Discussed: No.           Postoperative Care       PONV prophylaxis: Ondansetron (or other 5HT-3)     Comments:               Raudel Paredes MD    I have reviewed the pertinent notes and labs in the chart from the past 30 days and (re)examined the patient.  Any updates or changes from those notes are reflected in this note.                  "

## 2024-07-29 PROCEDURE — 250N000011 HC RX IP 250 OP 636: Performed by: OBSTETRICS & GYNECOLOGY

## 2024-07-29 PROCEDURE — 250N000011 HC RX IP 250 OP 636: Performed by: ANESTHESIOLOGY

## 2024-07-29 PROCEDURE — 250N000009 HC RX 250: Performed by: OBSTETRICS & GYNECOLOGY

## 2024-07-29 PROCEDURE — 0UQMXZZ REPAIR VULVA, EXTERNAL APPROACH: ICD-10-PCS | Performed by: OBSTETRICS & GYNECOLOGY

## 2024-07-29 PROCEDURE — 722N000001 HC LABOR CARE VAGINAL DELIVERY SINGLE

## 2024-07-29 PROCEDURE — 0KQM0ZZ REPAIR PERINEUM MUSCLE, OPEN APPROACH: ICD-10-PCS | Performed by: OBSTETRICS & GYNECOLOGY

## 2024-07-29 PROCEDURE — 250N000013 HC RX MED GY IP 250 OP 250 PS 637: Performed by: OBSTETRICS & GYNECOLOGY

## 2024-07-29 PROCEDURE — 120N000001 HC R&B MED SURG/OB

## 2024-07-29 RX ORDER — MODIFIED LANOLIN
OINTMENT (GRAM) TOPICAL
Status: DISCONTINUED | OUTPATIENT
Start: 2024-07-29 | End: 2024-07-30 | Stop reason: HOSPADM

## 2024-07-29 RX ORDER — TRANEXAMIC ACID 10 MG/ML
1 INJECTION, SOLUTION INTRAVENOUS EVERY 30 MIN PRN
Status: DISCONTINUED | OUTPATIENT
Start: 2024-07-29 | End: 2024-07-30 | Stop reason: HOSPADM

## 2024-07-29 RX ORDER — DOCUSATE SODIUM 100 MG/1
100 CAPSULE, LIQUID FILLED ORAL DAILY
Status: DISCONTINUED | OUTPATIENT
Start: 2024-07-29 | End: 2024-07-30 | Stop reason: HOSPADM

## 2024-07-29 RX ORDER — MISOPROSTOL 200 UG/1
400 TABLET ORAL
Status: DISCONTINUED | OUTPATIENT
Start: 2024-07-29 | End: 2024-07-30 | Stop reason: HOSPADM

## 2024-07-29 RX ORDER — BISACODYL 10 MG
10 SUPPOSITORY, RECTAL RECTAL DAILY PRN
Status: DISCONTINUED | OUTPATIENT
Start: 2024-07-29 | End: 2024-07-30 | Stop reason: HOSPADM

## 2024-07-29 RX ORDER — ACETAMINOPHEN 325 MG/1
650 TABLET ORAL EVERY 4 HOURS PRN
Status: DISCONTINUED | OUTPATIENT
Start: 2024-07-29 | End: 2024-07-30 | Stop reason: HOSPADM

## 2024-07-29 RX ORDER — MISOPROSTOL 200 UG/1
800 TABLET ORAL
Status: DISCONTINUED | OUTPATIENT
Start: 2024-07-29 | End: 2024-07-30 | Stop reason: HOSPADM

## 2024-07-29 RX ORDER — CARBOPROST TROMETHAMINE 250 UG/ML
250 INJECTION, SOLUTION INTRAMUSCULAR
Status: DISCONTINUED | OUTPATIENT
Start: 2024-07-29 | End: 2024-07-30 | Stop reason: HOSPADM

## 2024-07-29 RX ORDER — LOPERAMIDE HCL 2 MG
2 CAPSULE ORAL
Status: DISCONTINUED | OUTPATIENT
Start: 2024-07-29 | End: 2024-07-30 | Stop reason: HOSPADM

## 2024-07-29 RX ORDER — IBUPROFEN 800 MG/1
800 TABLET, FILM COATED ORAL EVERY 6 HOURS PRN
Status: DISCONTINUED | OUTPATIENT
Start: 2024-07-29 | End: 2024-07-30 | Stop reason: HOSPADM

## 2024-07-29 RX ORDER — LOPERAMIDE HCL 2 MG
4 CAPSULE ORAL
Status: DISCONTINUED | OUTPATIENT
Start: 2024-07-29 | End: 2024-07-30 | Stop reason: HOSPADM

## 2024-07-29 RX ORDER — OXYTOCIN 10 [USP'U]/ML
10 INJECTION, SOLUTION INTRAMUSCULAR; INTRAVENOUS
Status: DISCONTINUED | OUTPATIENT
Start: 2024-07-29 | End: 2024-07-30 | Stop reason: HOSPADM

## 2024-07-29 RX ORDER — METHYLERGONOVINE MALEATE 0.2 MG/ML
200 INJECTION INTRAVENOUS
Status: DISCONTINUED | OUTPATIENT
Start: 2024-07-29 | End: 2024-07-30 | Stop reason: HOSPADM

## 2024-07-29 RX ORDER — HYDROCORTISONE 25 MG/G
CREAM TOPICAL 3 TIMES DAILY PRN
Status: DISCONTINUED | OUTPATIENT
Start: 2024-07-29 | End: 2024-07-30 | Stop reason: HOSPADM

## 2024-07-29 RX ORDER — OXYTOCIN/0.9 % SODIUM CHLORIDE 30/500 ML
340 PLASTIC BAG, INJECTION (ML) INTRAVENOUS CONTINUOUS PRN
Status: DISCONTINUED | OUTPATIENT
Start: 2024-07-29 | End: 2024-07-30 | Stop reason: HOSPADM

## 2024-07-29 RX ADMIN — Medication: at 02:31

## 2024-07-29 RX ADMIN — IBUPROFEN 800 MG: 800 TABLET ORAL at 19:27

## 2024-07-29 RX ADMIN — DOCUSATE SODIUM 100 MG: 100 CAPSULE, LIQUID FILLED ORAL at 09:59

## 2024-07-29 RX ADMIN — ACETAMINOPHEN 650 MG: 325 TABLET ORAL at 19:27

## 2024-07-29 RX ADMIN — ACETAMINOPHEN 650 MG: 325 TABLET ORAL at 09:59

## 2024-07-29 RX ADMIN — Medication: at 07:53

## 2024-07-29 RX ADMIN — BENZOCAINE AND LEVOMENTHOL: 200; 5 SPRAY TOPICAL at 07:52

## 2024-07-29 RX ADMIN — IBUPROFEN 800 MG: 800 TABLET ORAL at 13:46

## 2024-07-29 RX ADMIN — LIDOCAINE HYDROCHLORIDE 20 ML: 10 INJECTION, SOLUTION EPIDURAL; INFILTRATION; INTRACAUDAL; PERINEURAL at 06:44

## 2024-07-29 RX ADMIN — KETOROLAC TROMETHAMINE 30 MG: 30 INJECTION, SOLUTION INTRAMUSCULAR at 07:34

## 2024-07-29 RX ADMIN — ACETAMINOPHEN 650 MG: 325 TABLET ORAL at 15:19

## 2024-07-29 ASSESSMENT — ACTIVITIES OF DAILY LIVING (ADL)
ADLS_ACUITY_SCORE: 18

## 2024-07-29 NOTE — L&D DELIVERY NOTE
24  Jessica Angelesdabryce   1995    Delivery Note    Procedure:     Physician:  Charlene Brumfield MD       The patient presented to labor and delivery at 41 weeks and 3 days for labor. Pregnancy complicated by h/o CS x1 desires TOLAC and asthma.  Augmentation with amniotomy. She progressed to complete cervical dilation, and with maternal pushing, female infant was delivered vertex in BLANCHE position at 0629 with Apgars of 8 and 9 at 41 weeks 4 days. No nuchal cord. After delayed cord clamping, the umbilical cord was double clamped and cut and the baby was placed on the mother's chest.    The placenta delivered spontaneously at 0647 and appeared complete and intact. The placenta was taken home by the patient. The uterine fundus was massaged and found to be firm with minimal vaginal bleeding. Total QBL 857cc mostly from lacerations.     Bilateral periurethral and second degree perineal lacerations were present.  The  lacerations were injected with 1% lidocaine until the patient no longer had sensation of these areas. The periurethral lacerations were repaired with 4-0 Vicryl in running locked stitches.  The second degree was repaired with 3-0 Vicryl in the usual fashion. The lacerations appeared hemostatic at the completion of the repair.     Sponge and needle counts were correct, and the patient tolerated the delivery well overall with the assistance of epidural anesthesia and local anesthetic used in repairing the lacerations.  Baby remained with the patient.    Charlene Brumfield MD    Addendum:  Weight of 3360g (7 pounds 7 ounces)     Charlene Brumfield MD

## 2024-07-29 NOTE — PROGRESS NOTES
Data: Jessica Jose transferred to 018/-60 via wheelchair. Patient transferred to Postpartum with .    Action: Receiving unit notified of transfer. Patient and support person notified of room change. Patient and belongings accompanied by Registered Nurse. Bedside report given to Megha JOINER. Fundal check performed with receiving RN. Oriented patient to surroundings. Call light within reach.    Response: Patient tolerated transfer.    Karen J Schoenberg, RN  2024 9:58 AM

## 2024-07-29 NOTE — ANESTHESIA POSTPROCEDURE EVALUATION
Patient: Jessica Jose    Procedure: * No procedures listed *       Anesthesia Type:  Epidural    Note:  Disposition: Admission   Postop Pain Control: Uneventful            Sign Out: Well controlled pain   PONV: No   Neuro/Psych: Uneventful            Sign Out: Acceptable/Baseline neuro status   Airway/Respiratory: Uneventful            Sign Out: Acceptable/Baseline resp. status   CV/Hemodynamics: Uneventful            Sign Out: Acceptable CV status; No obvious hypovolemia; No obvious fluid overload   Other NRE: NONE   DID A NON-ROUTINE EVENT OCCUR? No           Last vitals:  Vitals:    07/29/24 0816 07/29/24 0830 07/29/24 0847   BP: 110/62 108/59 109/61   Pulse:      Resp:      Temp:      SpO2: 100% 100%        Electronically Signed By: Yadi Mcelroy MD  July 29, 2024  1:49 PM

## 2024-07-29 NOTE — PROGRESS NOTES
Assumed care from Francis LEON RN  Pt has infant at breast.   Pt appears very sleepy but want to continue to hold infant. Plan of care for recovery  care and room transfer discussed.   RN at bedside.   Spouse asleep on couch at bedside.     Karen J Schoenberg, RN

## 2024-07-29 NOTE — PROGRESS NOTES
Pitocin increased to 8 at 0240, CTXs are every 2-4, category one tracing and patient is comfortable with contractions.    0515- Jessica reports feeling rectal pressure with contractions. Complete SVE, provider called to bedside.

## 2024-07-29 NOTE — PLAN OF CARE
Problem: Postpartum (Vaginal Delivery)  Goal: Optimal Pain Control and Function  2024 by Rubi Basurto RN  Outcome: Progressing  2024 by Rubi Basurto RN  Outcome: Progressing     Problem: Postpartum (Vaginal Delivery)  Goal: Absence of Infection Signs and Symptoms  2024 by Rubi Basurto RN  Outcome: Progressing  2024 by Rubi Basurto RN  Outcome: Progressing   Goal Outcome Evaluation:        at 0629, uterus firm at midline, patient reports pain is manageble currently. Jessica is skin to skin with  and has started breastfeeding. Nura esparza at bedside, Report given to Rabia JOINER.

## 2024-07-29 NOTE — PROVIDER NOTIFICATION
Octaviano informed Jessica is complete and a 0 station.  Octaviano states she will come in to see the patient.

## 2024-07-29 NOTE — PROGRESS NOTES
Dr. Brumfield called unit for update. Updated on SVE, EFM, and patient status. Plan to continue on low dose pitocin overnight.

## 2024-07-30 VITALS
WEIGHT: 142.3 LBS | OXYGEN SATURATION: 98 % | TEMPERATURE: 98.4 F | DIASTOLIC BLOOD PRESSURE: 49 MMHG | BODY MASS INDEX: 26.19 KG/M2 | HEART RATE: 86 BPM | SYSTOLIC BLOOD PRESSURE: 115 MMHG | RESPIRATION RATE: 16 BRPM | HEIGHT: 62 IN

## 2024-07-30 LAB
ERYTHROCYTE [DISTWIDTH] IN BLOOD BY AUTOMATED COUNT: 13.7 % (ref 10–15)
ERYTHROCYTE [DISTWIDTH] IN BLOOD BY AUTOMATED COUNT: 13.9 % (ref 10–15)
HCT VFR BLD AUTO: 24.7 % (ref 35–47)
HCT VFR BLD AUTO: 25.7 % (ref 35–47)
HGB BLD-MCNC: 7.9 G/DL (ref 11.7–15.7)
HGB BLD-MCNC: 8.1 G/DL (ref 11.7–15.7)
MCH RBC QN AUTO: 26.6 PG (ref 26.5–33)
MCH RBC QN AUTO: 26.8 PG (ref 26.5–33)
MCHC RBC AUTO-ENTMCNC: 31.5 G/DL (ref 31.5–36.5)
MCHC RBC AUTO-ENTMCNC: 32 G/DL (ref 31.5–36.5)
MCV RBC AUTO: 84 FL (ref 78–100)
MCV RBC AUTO: 85 FL (ref 78–100)
PLATELET # BLD AUTO: 118 10E3/UL (ref 150–450)
PLATELET # BLD AUTO: 134 10E3/UL (ref 150–450)
RBC # BLD AUTO: 2.95 10E6/UL (ref 3.8–5.2)
RBC # BLD AUTO: 3.04 10E6/UL (ref 3.8–5.2)
WBC # BLD AUTO: 10.5 10E3/UL (ref 4–11)
WBC # BLD AUTO: 10.6 10E3/UL (ref 4–11)

## 2024-07-30 PROCEDURE — 250N000013 HC RX MED GY IP 250 OP 250 PS 637: Performed by: OBSTETRICS & GYNECOLOGY

## 2024-07-30 PROCEDURE — 85048 AUTOMATED LEUKOCYTE COUNT: CPT | Performed by: OBSTETRICS & GYNECOLOGY

## 2024-07-30 PROCEDURE — 36415 COLL VENOUS BLD VENIPUNCTURE: CPT | Performed by: OBSTETRICS & GYNECOLOGY

## 2024-07-30 RX ADMIN — IBUPROFEN 800 MG: 800 TABLET ORAL at 18:27

## 2024-07-30 RX ADMIN — DOCUSATE SODIUM 100 MG: 100 CAPSULE, LIQUID FILLED ORAL at 11:48

## 2024-07-30 RX ADMIN — ACETAMINOPHEN 650 MG: 325 TABLET ORAL at 00:26

## 2024-07-30 RX ADMIN — ACETAMINOPHEN 650 MG: 325 TABLET ORAL at 16:02

## 2024-07-30 RX ADMIN — IBUPROFEN 800 MG: 800 TABLET ORAL at 10:56

## 2024-07-30 ASSESSMENT — ACTIVITIES OF DAILY LIVING (ADL)
ADLS_ACUITY_SCORE: 18

## 2024-07-30 NOTE — PLAN OF CARE
Goal Outcome Evaluation:      Plan of Care Reviewed With: patient    Overall Patient Progress: improvingOverall Patient Progress: improving    Outcome Evaluation: Vitals and assessments within normal limits. Moderate pain; perineum, using medications, ice, and alex bottle. Ambulating independent. Voiding spontaneous. Breastfeeding going well for mother, independent with feeds,  cluster feeding overnight and fussy, supplemented with donor milk. Bonding well with . Significant other at bedside, supportive.    Temp: 97.8  F (36.6  C) Temp src: Oral BP: 108/69 Pulse: 70   Resp: 16 SpO2: 99 % O2 Device: None (Room air)      Problem: Postpartum (Vaginal Delivery)  Goal: Successful Parent Role Transition  Outcome: Progressing     Problem: Postpartum (Vaginal Delivery)  Goal: Optimal Pain Control and Function  Outcome: Progressing  Intervention: Prevent or Manage Pain  Recent Flowsheet Documentation  Taken 2024 0026 by Olivia Ozuna, RN  Pain Management Interventions:   medication (see MAR)   cold applied     Problem: Breastfeeding  Goal: Effective Breastfeeding  Outcome: Progressing   Mother states infant cluster feeding for 2+ hours with intermittent latch and crying, sucking on fingers after breastfeeding. Started supplementing with donor milk as needed.

## 2024-07-30 NOTE — LACTATION NOTE
Initial Lactation Visit    Hours since delivery: 29 hours old    Gestational age at delivery: 41w4d     Diaper count: 2 wet 3 soiled meconium color     Feedings: 8 breast 1 bottle, human donor milk 15ml     Breastfeeding goals:12+months    Past breastfeeding experience: her first for 12 months.     Maternal health risk that may affect breastfeeding:None    Breast assessment:Round and Symmetrical, Everted and Intact    Feeding assessment:Infant breastfeeding on left side in traditional cross cradle hold. Infant sleeping, but quickly goes back to rhythmic sucking pattern with compression of breast. Reviewed ways for a deeper latch, U shape hold under breast and normal feeding patterns. Swallows 3:1. Mother practiced hand expression and could easily express drops of colostrum.     Feeding plan:Breastfeeding Plan:     Offer breast every 2-3 hours.   Massage breast to encourage milk flow   Strive for a deep and comfortable latch  Positioning reminders:  line up baby's nose to nipple   baby's chin touching the breast below the areola  ear, shoulder, hip, nice straight line   chin off chest  your thumb lined up like baby's mustache, fingers under breast like a baby's beard  cheeks touching breast  Switch sides when swallows slow, baby pauses lengthen and compressions do not help    Education:   [x] Expected  feeding patterns in the first few days (pg. 38 of Your Guide to To Postpartum and [place] Care)/ the Second Night  [x] Stages of milk production  [x] Hand expression   [x] Feeding cues     [x] Benefits of skin to skin  [x] Breastfeeding positions  [x] Tips to get and maintain a deep latch  [] Usage of nipple shield  [x] Nutritive vs.non-nutritive sucking  [x] Gentle breast compressions as needed  [x] How to tell when baby is finished  [] Supplementation  [] Paced bottle feeding  [] LPI feeding patterns  [] LBW feeding patterns  [x] Expected  output  [] [place] weight loss  [] Infant Feeding  Log  [x] Engorgement/Reverse Pressure Softening  [] Pumping  [] Breastpump education  [x] Inpatient breastfeeding support  [] Outpatient lactation resources    Follow up: Will follow up tomorrow, 7/31, if still in patient.

## 2024-07-30 NOTE — PROGRESS NOTES
24   Jessica REN Gjengdahl   1995    Postpartum Rounding Note    Subjective: Patient doing well. She is tolerating general diet, urinating without difficulty, and ambulating without lightheadedness. She is breastfeeding. Vaginal bleeding is within normal limits.      Vital signs:  Temp: 98.9  F (37.2  C) Temp src: Oral BP: 108/57 Pulse: 84   Resp: 16 SpO2: 97 % O2 Device: None (Room air)        Physical Exam:  General: no distress  Abdomen: nontender, uterine fundus firm below umbilicus  Extremities: no calf pain, minimal symmetric edema in legs  Perineum: lacerations well approximated; no hematomas seen    Labs: all recent labs reviewed      Assessment/Plan: 29 year old PPD#1 s/p   1. Postpartum    - afebrile, VSS   - continue postpartum cares   2. Acute Blood Loss Anemia   - Hgb 11.4 > 7.9   - QBL 857cc   - asymptomatic    - discussed iron infusion, patient prefers to take PO iron after discharge  3. Asthma              - with exercise   - continue PTA albuterol prn  4. Gestational Thrombocytopenia               - Plt 141 on admission (250 on 24) > 118 PPD#1              - CBC prior to discharge     Discussed plan of care with patient and , and patient expressed understanding. Opportunity for questions given, and all questions were answered.    Disposition: discharge pending progress      Charlene Brumfield MD

## 2024-07-30 NOTE — DISCHARGE INSTRUCTIONS
Warning Signs after Having a Baby    Keep this paper on your fridge or somewhere else where you can see it.    Call your provider if you have any of these symptoms up to 12 weeks after having your baby.    Thoughts of hurting yourself or your baby  Pain in your chest or trouble breathing  Severe headache not helped by pain medicine  Eyesight concerns (blurry vision, seeing spots or flashes of light, other changes to eyesight)  Fainting, shaking or other signs of a seizure    Call 9-1-1 if you feel that it is an emergency.     The symptoms below can happen to anyone after giving birth. They can be very serious. Call your provider if you have any of these warning signs.    My provider s phone number: _______________________    Losing too much blood (hemorrhage)    Call your provider if you soak through a pad in less than an hour or pass blood clots bigger than a golf ball. These may be signs that you are bleeding too much.    Blood clots in the legs or lungs    After you give birth, your body naturally clots its blood to help prevent blood loss. Sometimes this increased clotting can happen in other areas of the body, like the legs or lungs. This can block your blood flow and be very dangerous.     Call your provider if you:  Have a red, swollen spot on the back of your leg that is warm or painful when you touch it.   Are coughing up blood.     Infection    Call your provider if you have any of these symptoms:  Fever of 100.4 F (38 C) or higher.  Pain or redness around your stitches if you had an incision.   Any yellow, white, or green fluid coming from places where you had stitches or surgery.    Mood Problems (postpartum depression)    Many people feel sad or have mood changes after having a baby. But for some people, these mood swings are worse.     Call your provider right away if you feel so anxious or nervous that you can't care for yourself or your baby.    Preeclampsia (high blood pressure)    Even if you  didn't have high blood pressure when you were pregnant, you are at risk for the high blood pressure disease called preeclampsia. This risk can last up to 12 weeks after giving birth.     Call your provider if you have:   Pain on your right side under your rib cage  Sudden swelling in the hands and face    Remember: You know your body. If something doesn't feel right, get medical help.     For informational purposes only. Not to replace the advice of your health care provider. Copyright 2020 [facility]. All rights reserved. Clinically reviewed by Tereza Hernández, RNC-OB, MSN. [facility] app 434369 - Rev 02/23.

## 2024-07-30 NOTE — PLAN OF CARE
Goal Outcome Evaluation:      Plan of Care Reviewed With: patient, spouse    Overall Patient Progress: improvingOverall Patient Progress: improving    Outcome Evaluation: vital signs and assessments within normal limits.  Up [place] voiding well and passing flatus.  Breastfeeding well with minimal assist and pointers.  Lactation nurse consult ordered and informed to see patient.      Problem: Postpartum (Vaginal Delivery)  Goal: Successful Parent Role Transition  Outcome: Progressing  Intervention: Support Parent Role Transition  Recent Flowsheet Documentation  Taken 7/29/2024 1739 by Faby Mackey RN  Supportive Measures:   active listening utilized   goal-setting facilitated   decision-making supported   positive reinforcement provided   verbalization of feelings encouraged  Parent-Child Attachment Promotion: caring behavior modeled  Taken 7/29/2024 1356 by Faby Mackey RN  Supportive Measures:   active listening utilized   goal-setting facilitated   decision-making supported   positive reinforcement provided   verbalization of feelings encouraged  Parent-Child Attachment Promotion: caring behavior modeled  Taken 7/29/2024 0959 by Faby Mackey RN  Supportive Measures:   active listening utilized   goal-setting facilitated   decision-making supported   positive reinforcement provided   verbalization of feelings encouraged  Parent-Child Attachment Promotion: caring behavior modeled  Goal: Hemostasis  Outcome: Progressing  Goal: Absence of Infection Signs and Symptoms  Outcome: Progressing  Goal: Anesthesia/Sedation Recovery  Outcome: Progressing  Goal: Optimal Pain Control and Function  Outcome: Progressing  Intervention: Prevent or Manage Pain  Recent Flowsheet Documentation  Taken 7/29/2024 1739 by Faby Mackey RN  Perineal Care:   absorbent brief/pad changed   perineal spray bottle/warm water use encouraged  Taken 7/29/2024 1519 by Faby Mackey, RN  Pain Management Interventions:   medication (see MAR)   care  clustered   cold applied   emotional support   heat applied   pain management plan reviewed with patient/caregiver   pillow support provided   repositioned   therapeutic presence  Perineal Care:   absorbent brief/pad changed   perineal spray bottle/warm water use encouraged  Taken 7/29/2024 1356 by Faby Mackey RN  Perineal Care:   absorbent brief/pad changed   perineal spray bottle/warm water use encouraged  Taken 7/29/2024 1346 by Faby Mackey RN  Pain Management Interventions:   medication (see MAR)   cold applied   care clustered   emotional support   therapeutic presence  Perineal Care:   absorbent brief/pad changed   perineal spray bottle/warm water use encouraged  Taken 7/29/2024 0959 by Faby Mackey RN  Pain Management Interventions:   medication (see MAR)   cold applied   care clustered   emotional support   therapeutic presence  Goal: Effective Urinary Elimination  Outcome: Progressing     Problem: Breastfeeding  Goal: Effective Breastfeeding  Outcome: Progressing  Intervention: Promote Effective Breastfeeding  Recent Flowsheet Documentation  Taken 7/29/2024 1739 by Faby Mackey RN  Parent-Child Attachment Promotion: caring behavior modeled  Taken 7/29/2024 1356 by Faby Mackey RN  Parent-Child Attachment Promotion: caring behavior modeled  Taken 7/29/2024 0959 by Faby Mackey RN  Parent-Child Attachment Promotion: caring behavior modeled  Intervention: Support Exclusive Breastfeeding Success  Recent Flowsheet Documentation  Taken 7/29/2024 1739 by Faby Mackey RN  Supportive Measures:   active listening utilized   goal-setting facilitated   decision-making supported   positive reinforcement provided   verbalization of feelings encouraged  Taken 7/29/2024 1356 by Faby Mackey RN  Supportive Measures:   active listening utilized   goal-setting facilitated   decision-making supported   positive reinforcement provided   verbalization of feelings encouraged  Taken 7/29/2024 0959 by Faby Mackey  RN  Supportive Measures:   active listening utilized   goal-setting facilitated   decision-making supported   positive reinforcement provided   verbalization of feelings encouraged

## 2024-07-30 NOTE — PLAN OF CARE
"Goal Outcome Evaluation: Progressing      Plan of Care Reviewed With: patient    Overall Patient Progress: improvingOverall Patient Progress: improving    Patient is ambulating independently in room, caring for herself and infant. Spouse is at the bedside and is attentive to patient and infant as well.    Pain being managed with PRN Ibuprofen and Tylenol per MAR, Ice packs to perineum, and Aqua-K heating pad to abdomen.     Fundus is firm, bleeding WDL. VSS.    /51 (BP Location: Left arm, Patient Position: Semi-De La Rosa's, Cuff Size: Adult Regular)   Pulse 87   Temp 98.1  F (36.7  C) (Oral)   Resp 16   Ht 1.575 m (5' 2\")   Wt 68 kg (150 lb)   SpO2 100%   Breastfeeding Unknown   BMI 27.44 kg/m      Emeli Bravo RN on 7/29/2024 at 10:43 PM        "

## 2024-07-30 NOTE — PLAN OF CARE
Goal Outcome Evaluation: Adequate for care transition    D:  Patient desires discharge home.  Discharge orders received and entered by provider.  A:  Discharge instructions reviewed with the patient.  All questions and concerns addressed.  R:  Discharge criteria met.  4 Part ID bands double checked.   discharged in car seat with parents.  The nursing assistant escorted patient to car at 19:15.    FIDEL GARCIA RN on 2024 at 7:20 PM

## 2024-07-31 NOTE — DISCHARGE SUMMARY
24   Jessica REN Gjengdabryce   1995    Discharge summary    Admit date: 2024     Discharge date: 24       Admit diagnoses:  1.  Labor   2. IUP at 41 weeks 3 days  3. H/o CS x1, desires TOLAC  4. Asthma  5. Gestational Thrombocytopenia     Discharge diagnoses:  Same plus s/p , acute blood loss anemia    Hospital course:   The patient was admitted at 41 weeks and 3 days for labor.  Labor was augmented with AROM.  The patient had a female infant by  at 41 weeks and 4 days; see delivery note.  After , she was tolerating a normal diet, urinating without difficulty, ambulating without lightheadedness, and her vaginal bleeding was within normal limits for postpartum.  She was discharged to home on postpartum day 1.    Rubella immune  Rh +    Other problems addressed:  1. Acute Blood Loss Anemia              - Hgb 11.4 > 7.9 > 8.1              - QBL 857cc              - asymptomatic               - discussed iron infusion, patient prefers to take PO iron after discharge  2. Asthma              - with exercise   - continue PTA albuterol prn  3. Gestational Thrombocytopenia               - Plt 141 on admission (250 on 24) > 118 PPD#1 AM > 134 PPD#1 PM    Disposition: Discharged to home with follow-up in office in 6 weeks.      Charlene Brumfield MD